# Patient Record
Sex: FEMALE | Race: WHITE | NOT HISPANIC OR LATINO | Employment: FULL TIME | URBAN - METROPOLITAN AREA
[De-identification: names, ages, dates, MRNs, and addresses within clinical notes are randomized per-mention and may not be internally consistent; named-entity substitution may affect disease eponyms.]

---

## 2017-03-30 ENCOUNTER — OFFICE VISIT (OUTPATIENT)
Dept: FAMILY MEDICINE | Facility: CLINIC | Age: 22
End: 2017-03-30
Payer: COMMERCIAL

## 2017-03-30 VITALS
TEMPERATURE: 98.3 F | HEART RATE: 92 BPM | DIASTOLIC BLOOD PRESSURE: 70 MMHG | HEIGHT: 68 IN | BODY MASS INDEX: 22.45 KG/M2 | SYSTOLIC BLOOD PRESSURE: 108 MMHG | WEIGHT: 148.1 LBS | OXYGEN SATURATION: 99 %

## 2017-03-30 DIAGNOSIS — R10.31 RLQ ABDOMINAL PAIN: Primary | ICD-10-CM

## 2017-03-30 PROBLEM — M43.16 SPONDYLOLISTHESIS OF LUMBAR REGION: Status: ACTIVE | Noted: 2017-03-30

## 2017-03-30 PROCEDURE — 99203 OFFICE O/P NEW LOW 30 MIN: CPT | Performed by: FAMILY MEDICINE

## 2017-03-30 NOTE — NURSING NOTE
"Chief Complaint   Patient presents with     Abdominal Pain       Initial /70 (BP Location: Right arm, Patient Position: Chair, Cuff Size: Adult Regular)  Pulse 92  Temp 98.3  F (36.8  C) (Oral)  Ht 5' 7.5\" (1.715 m)  Wt 148 lb 1.6 oz (67.2 kg)  SpO2 99%  BMI 22.85 kg/m2 Estimated body mass index is 22.85 kg/(m^2) as calculated from the following:    Height as of this encounter: 5' 7.5\" (1.715 m).    Weight as of this encounter: 148 lb 1.6 oz (67.2 kg).  BP completed using cuff size: regular      Shanice Hendrickson MA    "

## 2017-03-30 NOTE — MR AVS SNAPSHOT
"              After Visit Summary   3/30/2017    Precious Leavitt    MRN: [id number]           Patient Information     Date Of Birth          1995        Visit Information        Provider Department      3/30/2017 10:40 AM Yodit Rhodes, DO [facility]        Today's Diagnoses     RLQ abdominal pain    -  1       Follow-ups after your visit        Who to contact     If you have questions or need follow up information about today's clinic visit or your schedule please contact [facility] directly at [phone].  Normal or non-critical lab and imaging results will be communicated to you by Novaliqhart, letter or phone within 4 business days after the clinic has received the results. If you do not hear from us within 7 days, please contact the clinic through Novaliqhart or phone. If you have a critical or abnormal lab result, we will notify you by phone as soon as possible.  Submit refill requests through Ensequence or call your pharmacy and they will forward the refill request to us. Please allow 3 business days for your refill to be completed.          Additional Information About Your Visit        MyChart Information     Ensequence lets you send messages to your doctor, view your test results, renew your prescriptions, schedule appointments and more. To sign up, go to [email or web address] . Click on \"Log in\" on the left side of the screen, which will take you to the Welcome page. Then click on \"Sign up Now\" on the right side of the page.     You will be asked to enter the access code listed below, as well as some personal information. Please follow the directions to create your username and password.     Your access code is: [email or web address]  Expires: 2017 12:38 PM     Your access code will  in 90 days. If you need help or a new code, please call your [facility] or [phone].        Care EveryWhere ID     This is your Care EveryWhere ID. This could be used by " "other organizations to access your [place] medical records  [id number]        Your Vitals Were     Pulse Temperature Height Pulse Oximetry BMI (Body Mass Index)       92 98.3  F (36.8  C) (Oral) 5' 7.5\" (1.715 m) 99% 22.85 kg/m2        Blood Pressure from Last 3 Encounters:   03/30/17 108/70    Weight from Last 3 Encounters:   03/30/17 148 lb 1.6 oz (67.2 kg) (79 %)*     * Growth percentiles are based on [facility] 2-20 Years data.              Today, you had the following     No orders found for display       Primary Care Provider    None Specified       No primary provider on file.        Thank you!     Thank you for choosing [facility]  for your care. Our goal is always to provide you with excellent care. Hearing back from our patients is one way we can continue to improve our services. Please take a few minutes to complete the written survey that you may receive in the mail after your visit with us. Thank you!             Your Updated Medication List - Protect others around you: Learn how to safely use, store and throw away your medicines at www.disposemymeds.org.      Notice  As of 3/30/2017 12:38 PM    You have not been prescribed any medications.      "

## 2017-03-30 NOTE — PROGRESS NOTES
"  SUBJECTIVE:                                                    Precious Leavitt is a 21 year old female who presents to clinic today for the following health issues:    Abdominal Pain      Duration: Tuesday morning, had it about a month ago too    Description (location/character/radiation): lower right side, intermittent pangs of pain that initially lasted an hour, but now pain is more constant       Associated flank pain: sore on the right side     Intensity:  4/10    Accompanying signs and symptoms:        Fever/Chills: no        Gas/Bloating: no        Nausea/vomitting: no        Diarrhea: no        Dysuria or Hematuria: no     History (previous similar pain/trauma/previous testing): about one month ago    Precipitating or alleviating factors:       Pain worse with eating/BM/urination: None        Pain relieved by BM: no     Therapies tried and outcome: Tylenol     LMP:   Around 3/20/17     - just had a yeast infection about 2 weeks ago  - painful when she sneezes   - about 3 weeks ago she felt constipated and pain in the middle of her stomach, but it went away       Problem list and histories reviewed & adjusted, as indicated.  Additional history: as documented      ROS:  Constitutional, HEENT, cardiovascular, pulmonary, gi and gu systems are negative, except as otherwise noted.    OBJECTIVE:                                                    /70 (BP Location: Right arm, Patient Position: Chair, Cuff Size: Adult Regular)  Pulse 92  Temp 98.3  F (36.8  C) (Oral)  Ht 5' 7.5\" (1.715 m)  Wt 148 lb 1.6 oz (67.2 kg)  SpO2 99%  BMI 22.85 kg/m2  Body mass index is 22.85 kg/(m^2).  GENERAL: healthy, alert and no distress  RESP: lungs clear to auscultation - no rales, rhonchi or wheezes  CV: regular rates and rhythm, normal S1 S2, no S3 or S4 and no murmur, click or rub  ABDOMEN: bowel sounds normal, soft, mild TTP in RLQ with no rebound or guarding, no masses or organomegaly     ASSESSMENT/PLAN:            "                                           1. RLQ abdominal pain  - Large differential   - Patient just stopped her period and reports no sexual activity, so did not check a UCG  - Pain is mild so much less likely an appendicitis or other surgical emergency  - Suspect ovarian cysts vs msk issue vs gas pain  - Discussed peritoneal signs to watch for and to go to ER if these develop     Follow up on Saturday morning or Monday if pain continues or is worsening.  Go to ER immediately if pain suddenly gets severe.  If this continues she may need US vs CT     Yodit Rhodes,   [facility]

## 2017-03-31 ENCOUNTER — TELEPHONE (OUTPATIENT)
Dept: FAMILY MEDICINE | Facility: CLINIC | Age: 22
End: 2017-03-31

## 2017-03-31 NOTE — TELEPHONE ENCOUNTER
Mother calling regarding yesterday's visit.  CTC on file.  Pain some better with Ibuprofen but still there and not sure what to do.  Is coming back next week-end again.  Discussed giving a little time and if mid week still having issues then calling for order or follow-up appointment.  If worsening symptoms advised visit near her college.  Mother agrees with plan.  Marika Victor RN

## 2017-10-14 ENCOUNTER — OFFICE VISIT (OUTPATIENT)
Dept: FAMILY MEDICINE | Facility: CLINIC | Age: 22
End: 2017-10-14
Payer: COMMERCIAL

## 2017-10-14 VITALS
BODY MASS INDEX: 23.3 KG/M2 | WEIGHT: 151 LBS | RESPIRATION RATE: 12 BRPM | TEMPERATURE: 98.2 F | OXYGEN SATURATION: 100 % | HEART RATE: 92 BPM | DIASTOLIC BLOOD PRESSURE: 75 MMHG | SYSTOLIC BLOOD PRESSURE: 130 MMHG

## 2017-10-14 DIAGNOSIS — G43.109 MIGRAINE WITH AURA AND WITHOUT STATUS MIGRAINOSUS, NOT INTRACTABLE: Primary | ICD-10-CM

## 2017-10-14 PROCEDURE — 99213 OFFICE O/P EST LOW 20 MIN: CPT | Performed by: PHYSICIAN ASSISTANT

## 2017-10-14 RX ORDER — CETIRIZINE HYDROCHLORIDE 5 MG/1
TABLET, CHEWABLE ORAL
COMMUNITY
Start: 2007-05-14

## 2017-10-14 RX ORDER — SUMATRIPTAN 50 MG/1
50 TABLET, FILM COATED ORAL
Qty: 9 TABLET | Refills: 1 | Status: SHIPPED | OUTPATIENT
Start: 2017-10-14

## 2017-10-14 NOTE — NURSING NOTE
"Chief Complaint   Patient presents with     Headache       Initial /75 (BP Location: Right arm, Patient Position: Chair, Cuff Size: Adult Regular)  Pulse 92  Temp 98.2  F (36.8  C) (Oral)  Resp 12  Wt 151 lb (68.5 kg)  SpO2 100%  BMI 23.3 kg/m2 Estimated body mass index is 23.3 kg/(m^2) as calculated from the following:    Height as of 3/30/17: 5' 7.5\" (1.715 m).    Weight as of this encounter: 151 lb (68.5 kg).  Medication Reconciliation: complete       "

## 2017-10-14 NOTE — MR AVS SNAPSHOT
After Visit Summary   10/14/2017    Precious Leavitt    MRN: [id number]           Patient Information     Date Of Birth          1995        Visit Information        Provider Department      10/14/2017 10:00 AM Romeo Hurst PA-C [facility]        Today's Diagnoses     Migraine with aura and without status migrainosus, not intractable    -  1      Care Instructions      Preventing Migraine Headaches: Triggers     Red wine is a common migraine trigger.     The first step in preventing migraines is to learn what triggers them. You may then be able to control your triggers to avoid or reduce the severity of your migraines.  Know your triggers  Be aware that you may have more than one trigger, and that some triggers may work together. Common migraine triggers include:    Food and nutrition. Skipping meals or not drinking enough water can trigger headaches. So can certain foods, such as caffeine, monosodium glutamate (MSG), aged cheese, or sausage.    Alcohol. Red wine and other alcoholic beverages are common migraine triggers.    Chemicals. Scents, cleaning products, gasoline, glue, perfume, and paint can be triggers. So can tobacco smoke, including secondhand smoke.    Emotions. Stress can trigger headaches or make them worse once they begin.    Sleep disruption. Staying up late, sleeping late, and traveling across time zones can disrupt your sleep cycle, triggering headaches.    Hormones. Many women notice that migraines tend to happen at a certain point in their menstrual cycle. Birth control pills or hormone replacement therapy may also trigger migraines.    Environment and weather. Air travel, changes in altitude, air pressure changes, hot sun, or bright or flashing lights can be triggers.  Control your triggers  These are some of the things you can do to try to control triggers:    Avoid triggers if you can. For example, stay clear of alcohol and foods that trigger  "your headaches. Use unscented household products. Keep regular sleep habits. Manage stress to help control emotional triggers.    Change your behavior at times when triggers can't be avoided. For example, make sure to get enough rest and drink plenty of water while you're traveling. Make sure to carry a hat, sunglasses, and your medicines. Be alert for migraine symptoms, so you can treat a migraine early if it happens.  Date Last Reviewed: 10/9/2015    4060-5257 [employer]. [address] [place] [address]. All rights reserved. This information is not intended as a substitute for professional medical care. Always follow your healthcare professional's instructions.                Follow-ups after your visit        Who to contact     If you have questions or need follow up information about today's clinic visit or your schedule please contact [facility] directly at [phone].  Normal or non-critical lab and imaging results will be communicated to you by Picotek INChart, letter or phone within 4 business days after the clinic has received the results. If you do not hear from us within 7 days, please contact the clinic through Picotek INChart or phone. If you have a critical or abnormal lab result, we will notify you by phone as soon as possible.  Submit refill requests through Hashplex or call your pharmacy and they will forward the refill request to us. Please allow 3 business days for your refill to be completed.          Additional Information About Your Visit        Picotek INCharSanivation Information     Hashplex lets you send messages to your doctor, view your test results, renew your prescriptions, schedule appointments and more. To sign up, go to [email or web address] . Click on \"Log in\" on the left side of the screen, which will take you to the Welcome page. Then click on \"Sign up Now\" on the right side of the page.     You will be asked to enter the access code listed below, as well as some " personal information. Please follow the directions to create your username and password.     Your access code is: [email or web address]  Expires: 2018 10:50 AM     Your access code will  in 90 days. If you need help or a new code, please call your [place] clinic or [phone].        Care EveryWhere ID     This is your Care EveryWhere ID. This could be used by other organizations to access your [place] medical records  [id number]        Your Vitals Were     Pulse Temperature Respirations Pulse Oximetry BMI (Body Mass Index)       92 98.2  F (36.8  C) (Oral) 12 100% 23.3 kg/m2        Blood Pressure from Last 3 Encounters:   10/14/17 130/75   17 108/70    Weight from Last 3 Encounters:   10/14/17 151 lb (68.5 kg) (81 %)*   17 148 lb 1.6 oz (67.2 kg) (79 %)*     * Growth percentiles are based on [facility] 2-20 Years data.              Today, you had the following     No orders found for display         Today's Medication Changes          These changes are accurate as of: 10/14/17 10:50 AM.  If you have any questions, ask your nurse or doctor.               Start taking these medicines.        Dose/Directions    SUMAtriptan 50 MG tablet   Commonly known as:  IMITREX   Used for:  Migraine with aura and without status migrainosus, not intractable   Started by:  Romeo Hurst PA-C        Dose:  50 mg   Take 1 tablet (50 mg) by mouth at onset of headache for migraine May repeat in 2 hours. Max 4 tablets/24 hours.   Quantity:  9 tablet   Refills:  1            Where to get your medicines      These medications were sent to [facility]/pharmacy #[id number] - [place], [place] - [address]  [address], [facility] [address]     Phone:  [phone]     SUMAtriptan 50 MG tablet                Primary Care Provider    None Specified       No primary provider on file.        Equal Access to Services     LOAN MONGE AH: Angela Chong, jovana dumont, saul mccann  yokasta bourgeoisvelasquezlouis almaraz'aaboaz ah. So [facility] [phone].    ATENCIÓN: Si margotla shahriar, tiene a shah disposición servicios gratuitos de asistencia lingüística. [place] al [phone].    We comply with applicable federal civil rights laws and [place] laws. We do not discriminate on the basis of race, color, national origin, age, disability, sex, sexual orientation, or gender identity.            Thank you!     Thank you for choosing [facility]  for your care. Our goal is always to provide you with excellent care. Hearing back from our patients is one way we can continue to improve our services. Please take a few minutes to complete the written survey that you may receive in the mail after your visit with us. Thank you!             Your Updated Medication List - Protect others around you: Learn how to safely use, store and throw away your medicines at www.disposemymeds.org.          This list is accurate as of: 10/14/17 10:50 AM.  Always use your most recent med list.                   Brand Name Dispense Instructions for use Diagnosis    SUMAtriptan 50 MG tablet    IMITREX    9 tablet    Take 1 tablet (50 mg) by mouth at onset of headache for migraine May repeat in 2 hours. Max 4 tablets/24 hours.    Migraine with aura and without status migrainosus, not intractable       ZYRTEC CHILDRENS ALLERGY 5 MG Chew   Generic drug:  cetirizine

## 2017-10-14 NOTE — PROGRESS NOTES
"  SUBJECTIVE:   Precious Leavitt is a 21 year old female who presents to clinic today for the following health issues:    Headache - head feels \"heavy\" and ears are plugged  Onset: Dec '16 - on and off    Description:   Location: back of head in Dec but not the are bilateral on the sides, also getting optic migraines   Character: throbbing pain in Dec , sharp pain now  Frequency:  3 optic migraines in the last 2 wks. Usually has 1-2 per month.   Duration:  Optic 1 hr, migraine last 1-2 days    Intensity: moderate    Progression of Symptoms:  intermittent    Accompanying Signs & Symptoms:  Stiff neck: YES  Neck or upper back pain: no  Fever: no  Sinus pressure: YES- sometimes  Nausea or vomiting: YES- nausea with optic ha  Dizziness: no  Numbness: YES- every once in a while in the left hand  Weakness: no  Visual changes: YES- aura of tunnel vision.     History:   Head trauma: no  Family history of migraines: YES- mom - optic and migraine  Previous tests for headaches: no  Neurologist evaluations: no  Able to do daily activities: YES  Wake with a headaches: YES- sometimes  Do headaches wake you up: no  Daily pain medication use: no  Work/school stressors/changes: YES- midterms    Precipitating factors:   Does light make it worse: YES  Does sound make it worse: YES    Alleviating factors:  Does sleep help: YES    Therapies Tried and outcome: Tylenol-no improvement.       Problem list and histories reviewed & adjusted, as indicated.  Additional history: as documented    Patient Active Problem List   Diagnosis     Spondylolisthesis of lumbar region     Migraine with aura and without status migrainosus, not intractable     No past surgical history on file.    Social History   Substance Use Topics     Smoking status: Never Smoker     Smokeless tobacco: Never Used     Alcohol use Yes      Comment: occasionally     Family History   Problem Relation Age of Onset     Prostate Cancer Paternal Grandfather      Melanoma Paternal " Aunt      Migraines Mother          Current Outpatient Prescriptions   Medication Sig Dispense Refill     SUMAtriptan (IMITREX) 50 MG tablet Take 1 tablet (50 mg) by mouth at onset of headache for migraine May repeat in 2 hours. Max 4 tablets/24 hours. 9 tablet 1     cetirizine (ZYRTEC CHILDRENS ALLERGY) 5 MG CHEW        No Known Allergies  BP Readings from Last 3 Encounters:   10/14/17 130/75   03/30/17 108/70    Wt Readings from Last 3 Encounters:   10/14/17 151 lb (68.5 kg) (81 %)*   03/30/17 148 lb 1.6 oz (67.2 kg) (79 %)*     * Growth percentiles are based on [facility] 2-20 Years data.                        Reviewed and updated as needed this visit by clinical staffAllergies  Meds  Problems       Reviewed and updated as needed this visit by Provider  Allergies  Meds  Problems         ROS:  Constitutional, HEENT, neuro, psych, cardiovascular, pulmonary, gi systems are negative, except as otherwise noted.      OBJECTIVE:   /75 (BP Location: Right arm, Patient Position: Chair, Cuff Size: Adult Regular)  Pulse 92  Temp 98.2  F (36.8  C) (Oral)  Resp 12  Wt 151 lb (68.5 kg)  SpO2 100%  BMI 23.3 kg/m2  Body mass index is 23.3 kg/(m^2).  GENERAL: healthy, alert and no distress  EYES: Eyes grossly normal to inspection, PERRL and conjunctivae and sclerae normal  HENT: normal cephalic/atraumatic, ear canals and TM's normal, nasal mucosa edematous , oropharynx clear, oral mucous membranes moist and sinuses: not tender  RESP: lungs clear to auscultation - no rales, rhonchi or wheezes  CV: regular rates and rhythm, normal S1 S2, no S3 or S4 and no murmur, click or rub  NEURO: Normal strength and tone, sensory exam grossly normal, mentation intact, speech normal, cranial nerves 2-12 intact, gait normal including heel/toe/tandem walking, Romberg normal, rapid alternating movements normal and finger to nose normal.   PSYCH: mentation appears normal, affect normal/bright    Diagnostic Test Results:  none      ASSESSMENT/PLAN:     (G43.109) Migraine with aura and without status migrainosus, not intractable  (primary encounter diagnosis)  Comment: evident on history. Neuro exam normal. Trigger likely stress from school though allergies are possible. Of note, has been given imitrex in the past, but never used due to fear of side effects. Discussed while there are potential side effects, these are rare and given the severity of symptoms, would recommend taking this along with nsaids and benadryl prn. Given frequency currently, preventative medication is not needed, but if worsening, this could be possible. Also discussed avoidance of common triggers. See patient instructions. Follow up prn.    Plan: SUMAtriptan (IMITREX) 50 MG tablet        -Medication use and side effects discussed with the patient. Patient is in complete understanding and agreement with plan.         Follow up: as above     Romeo Hurst PA-C  [facility]

## 2017-10-14 NOTE — PATIENT INSTRUCTIONS
Preventing Migraine Headaches: Triggers     Red wine is a common migraine trigger.     The first step in preventing migraines is to learn what triggers them. You may then be able to control your triggers to avoid or reduce the severity of your migraines.  Know your triggers  Be aware that you may have more than one trigger, and that some triggers may work together. Common migraine triggers include:    Food and nutrition. Skipping meals or not drinking enough water can trigger headaches. So can certain foods, such as caffeine, monosodium glutamate (MSG), aged cheese, or sausage.    Alcohol. Red wine and other alcoholic beverages are common migraine triggers.    Chemicals. Scents, cleaning products, gasoline, glue, perfume, and paint can be triggers. So can tobacco smoke, including secondhand smoke.    Emotions. Stress can trigger headaches or make them worse once they begin.    Sleep disruption. Staying up late, sleeping late, and traveling across time zones can disrupt your sleep cycle, triggering headaches.    Hormones. Many women notice that migraines tend to happen at a certain point in their menstrual cycle. Birth control pills or hormone replacement therapy may also trigger migraines.    Environment and weather. Air travel, changes in altitude, air pressure changes, hot sun, or bright or flashing lights can be triggers.  Control your triggers  These are some of the things you can do to try to control triggers:    Avoid triggers if you can. For example, stay clear of alcohol and foods that trigger your headaches. Use unscented household products. Keep regular sleep habits. Manage stress to help control emotional triggers.    Change your behavior at times when triggers can't be avoided. For example, make sure to get enough rest and drink plenty of water while you're traveling. Make sure to carry a hat, sunglasses, and your medicines. Be alert for migraine symptoms, so you can treat a migraine early if it  happens.  Date Last Reviewed: 10/9/2015    1644-8623 [employer]. [address] [place] [address], [place] [address]. All rights reserved. This information is not intended as a substitute for professional medical care. Always follow your healthcare professional's instructions.

## 2019-01-10 ENCOUNTER — OFFICE VISIT (OUTPATIENT)
Dept: FAMILY MEDICINE | Facility: CLINIC | Age: 24
End: 2019-01-10
Payer: COMMERCIAL

## 2019-01-10 VITALS
SYSTOLIC BLOOD PRESSURE: 128 MMHG | HEART RATE: 93 BPM | WEIGHT: 160.2 LBS | TEMPERATURE: 97.8 F | DIASTOLIC BLOOD PRESSURE: 80 MMHG | HEIGHT: 68 IN | BODY MASS INDEX: 24.28 KG/M2 | RESPIRATION RATE: 12 BRPM

## 2019-01-10 DIAGNOSIS — Z12.4 SCREENING FOR MALIGNANT NEOPLASM OF CERVIX: ICD-10-CM

## 2019-01-10 DIAGNOSIS — Z23 NEED FOR TDAP VACCINATION: ICD-10-CM

## 2019-01-10 DIAGNOSIS — Z00.00 ROUTINE HISTORY AND PHYSICAL EXAMINATION OF ADULT: Primary | ICD-10-CM

## 2019-01-10 PROCEDURE — G0145 SCR C/V CYTO,THINLAYER,RESCR: HCPCS | Performed by: PHYSICIAN ASSISTANT

## 2019-01-10 PROCEDURE — 99395 PREV VISIT EST AGE 18-39: CPT | Mod: 25 | Performed by: PHYSICIAN ASSISTANT

## 2019-01-10 PROCEDURE — G0124 SCREEN C/V THIN LAYER BY MD: HCPCS | Performed by: PHYSICIAN ASSISTANT

## 2019-01-10 PROCEDURE — 87491 CHLMYD TRACH DNA AMP PROBE: CPT | Performed by: PHYSICIAN ASSISTANT

## 2019-01-10 PROCEDURE — 90715 TDAP VACCINE 7 YRS/> IM: CPT | Performed by: PHYSICIAN ASSISTANT

## 2019-01-10 PROCEDURE — 87591 N.GONORRHOEAE DNA AMP PROB: CPT | Performed by: PHYSICIAN ASSISTANT

## 2019-01-10 PROCEDURE — 90471 IMMUNIZATION ADMIN: CPT | Performed by: PHYSICIAN ASSISTANT

## 2019-01-10 ASSESSMENT — ENCOUNTER SYMPTOMS
PARESTHESIAS: 0
COUGH: 0
CHILLS: 0
NAUSEA: 0
DIZZINESS: 0
ABDOMINAL PAIN: 0
HEARTBURN: 0
CONSTIPATION: 0
SHORTNESS OF BREATH: 0
MYALGIAS: 0
ARTHRALGIAS: 0
HEMATOCHEZIA: 0
NERVOUS/ANXIOUS: 0
DYSURIA: 0
FREQUENCY: 0
SORE THROAT: 0
DIARRHEA: 0
WEAKNESS: 0
HEADACHES: 0
EYE PAIN: 0
PALPITATIONS: 0
HEMATURIA: 0
JOINT SWELLING: 0
FEVER: 0
BREAST MASS: 0

## 2019-01-10 ASSESSMENT — MIFFLIN-ST. JEOR: SCORE: 1536.19

## 2019-01-10 NOTE — LETTER
January 16, 2019      Precious Leavitt  [address]  [facility] [address]    Dear ,      This letter is in regards to your recent cervical cancer screening (Pap smear). Your Pap smear result was reported as ASCUS or Atypical Squamous Cells of Undetermined Significance. This means that there were mildly abnormal cells found in the sample that we collected from your cervix. The vast majority of patients with this result do not have significant cervical abnormalities.     Therefore, current guidelines recommend that you return in ONE year to repeat your Pap smear.      If you have additional questions regarding this result, please call our registered nurse, Helen at [phone].    Sincerely,      Mey Valladares PA-C/Lynette Nissen RN

## 2019-01-10 NOTE — PROGRESS NOTES
SUBJECTIVE:   CC: Precious Leavitt is an 23 year old woman who presents for preventive health visit.     Physical   Annual:     Getting at least 3 servings of Calcium per day:  Yes    Bi-annual eye exam:  NO    Dental care twice a year:  Yes    Sleep apnea or symptoms of sleep apnea:  None    Diet:  Regular (no restrictions)    Frequency of exercise:  2-3 days/week    Duration of exercise:  Greater than 60 minutes    Taking medications regularly:  Not Applicable    Additional concerns today:  No    PHQ-2 Total Score: 0              Today's PHQ-2 Score:   PHQ-2 ( 1999 [employer]) 1/10/2019   Q1: Little interest or pleasure in doing things 0   Q2: Feeling down, depressed or hopeless 0   PHQ-2 Score 0   Q1: Little interest or pleasure in doing things Not at all   Q2: Feeling down, depressed or hopeless Not at all   PHQ-2 Score 0       Abuse: Current or Past(Physical, Sexual or Emotional)- No  Do you feel safe in your environment? Yes    Social History     Tobacco Use     Smoking status: Never Smoker     Smokeless tobacco: Never Used   Substance Use Topics     Alcohol use: Yes     Comment: occasionally     Alcohol Use 1/10/2019   If you drink alcohol do you typically have greater than 3 drinks per day OR greater than 7 drinks per week? No       Reviewed orders with patient.  Reviewed health maintenance and updated orders accordingly - Yes  Labs reviewed in EPIC    Mammogram not appropriate for this patient based on age.    Pertinent mammograms are reviewed under the imaging tab.  History of abnormal Pap smear: NO - age 21-29 PAP every 3 years recommended     Reviewed and updated as needed this visit by clinical staff  Tobacco  Allergies  Meds  Med Hx  Surg Hx  Fam Hx  Soc Hx        Reviewed and updated as needed this visit by Provider        History reviewed. No pertinent past medical history.     Review of Systems   Constitutional: Negative for chills and fever.   HENT: Negative for congestion, ear pain, hearing  "loss and sore throat.    Eyes: Negative for pain and visual disturbance.   Respiratory: Negative for cough and shortness of breath.    Cardiovascular: Negative for chest pain, palpitations and peripheral edema.   Gastrointestinal: Negative for abdominal pain, constipation, diarrhea, heartburn, hematochezia and nausea.   Breasts:  Negative for tenderness, breast mass and discharge.   Genitourinary: Negative for dysuria, frequency, genital sores, hematuria, pelvic pain, urgency, vaginal bleeding and vaginal discharge.   Musculoskeletal: Negative for arthralgias, joint swelling and myalgias.   Skin: Negative for rash.   Neurological: Negative for dizziness, weakness, headaches and paresthesias.   Psychiatric/Behavioral: Negative for mood changes. The patient is not nervous/anxious.           OBJECTIVE:   /80 (BP Location: Right arm, Patient Position: Chair, Cuff Size: Adult Regular)   Pulse 93   Temp 97.8  F (36.6  C) (Oral)   Resp 12   Ht 1.721 m (5' 7.75\")   Wt 72.7 kg (160 lb 3.2 oz)   LMP 01/01/2019 (Exact Date)   Breastfeeding? No   BMI 24.54 kg/m    Physical Exam  GENERAL: healthy, alert and no distress  EYES: Eyes grossly normal to inspection, PERRL and conjunctivae and sclerae normal  HENT: ear canals and TM's normal, nose and mouth without ulcers or lesions  NECK: no adenopathy, no asymmetry, masses, or scars and thyroid normal to palpation  RESP: lungs clear to auscultation - no rales, rhonchi or wheezes  BREAST: normal without masses, tenderness or nipple discharge and no palpable axillary masses or adenopathy  CV: regular rate and rhythm, normal S1 S2, no S3 or S4, no murmur, click or rub, no peripheral edema and peripheral pulses strong  ABDOMEN: soft, nontender, no hepatosplenomegaly, no masses and bowel sounds normal   (female): normal female external genitalia, normal urethral meatus, vaginal mucosa pink, moist, well rugated, and normal cervix/adnexa/uterus without masses or " "discharge  MS: no gross musculoskeletal defects noted, no edema  SKIN: no suspicious lesions or rashes  NEURO: Normal strength and tone, mentation intact and speech normal  PSYCH: mentation appears normal, affect normal/bright        ASSESSMENT/PLAN:   1. Routine history and physical examination of adult    - Chlamydia trachomatis PCR  - Neisseria gonorrhoeae PCR    2. Screening for malignant neoplasm of cervix    - Pap imaged thin layer screen only - recommended age 21 - 24 years    3. Need for Tdap vaccination  Tdap given      COUNSELING:  Reviewed preventive health counseling, as reflected in patient instructions       Regular exercise       Healthy diet/nutrition       Immunizations    Vaccinated for: TDAP             Contraception       Family planning       Safe sex practices/STD prevention    BP Readings from Last 1 Encounters:   01/10/19 128/80     Estimated body mass index is 24.54 kg/m  as calculated from the following:    Height as of this encounter: 1.721 m (5' 7.75\").    Weight as of this encounter: 72.7 kg (160 lb 3.2 oz).           reports that  has never smoked. she has never used smokeless tobacco.      Counseling Resources:  ATP IV Guidelines  Pooled Cohorts Equation Calculator  Breast Cancer Risk Calculator  FRAX Risk Assessment  ICSI Preventive Guidelines  Dietary Guidelines for Americans, 2010  USDA's MyPlate  ASA Prophylaxis  Lung CA Screening    Mey Valladares PA-C  [facility]"

## 2019-01-11 LAB
C TRACH DNA SPEC QL NAA+PROBE: NEGATIVE
N GONORRHOEA DNA SPEC QL NAA+PROBE: NEGATIVE
SPECIMEN SOURCE: NORMAL
SPECIMEN SOURCE: NORMAL

## 2019-01-16 ENCOUNTER — RESULT FOLLOW UP (OUTPATIENT)
Dept: FAMILY MEDICINE | Facility: CLINIC | Age: 24
End: 2019-01-16

## 2019-01-16 DIAGNOSIS — R87.610 ATYPICAL SQUAMOUS CELLS OF UNDETERMINED SIGNIFICANCE (ASCUS) ON PAPANICOLAOU SMEAR OF CERVIX: ICD-10-CM

## 2019-01-16 LAB
COPATH REPORT: ABNORMAL
PAP: ABNORMAL

## 2019-01-16 NOTE — PROGRESS NOTES
1/10/19 ASCUS, 23 yr old. Plan 1 yr Dx pap cytology, due by 1/10/20  1/16/19 [facility] result letter sent an marked as read by the patient.  12/27/19 Pap reminder letter sent. ([facility])  01/24/20 [facility] clinic and schedule. ([facility])  02/21/20 Patient is lost to pap tracking follow-up. FYI routed to provider. ([facility])

## 2019-01-16 NOTE — LETTER
December 27, 2019      Precious Leaivtt  [address]  [place] [address]    Dear ,      At [facility], your health and wellness is our primary concern. That is why we are following up on an abnormal pap from 01/10/19, which was reported as ASCUS. Your provider had recommended that you have a Pap smear completed by 01/10/20. Our records do not show that this has been scheduled.    It is important to complete the follow up that your provider has suggested for you to ensure that there are no worsening changes which may, over time, develop into cancer.      Please contact our office at  [phone] to schedule an appointment for a Pap smear at your earliest convenience. If you have questions or concerns, please call the clinic and we will be happy to assist you.    If you have completed the tests outside of [facility], please have the results forwarded to our office. We will update the chart for your primary Physician to review before your next annual physical.     Thank you for choosing [facility]!    Sincerely,      Your [facility] Team//[facility]

## 2019-10-02 ENCOUNTER — HEALTH MAINTENANCE LETTER (OUTPATIENT)
Age: 24
End: 2019-10-02

## 2020-01-24 ENCOUNTER — TELEPHONE (OUTPATIENT)
Dept: FAMILY MEDICINE | Facility: CLINIC | Age: 25
End: 2020-01-24

## 2020-01-24 NOTE — TELEPHONE ENCOUNTER
Pt is past due for f/u pap smear.  [facility] clinic and schedule.    Silvia Yang  Pap Tracking

## 2020-03-22 ENCOUNTER — HEALTH MAINTENANCE LETTER (OUTPATIENT)
Age: 25
End: 2020-03-22

## 2021-01-15 ENCOUNTER — HEALTH MAINTENANCE LETTER (OUTPATIENT)
Age: 26
End: 2021-01-15

## 2021-05-15 ENCOUNTER — HEALTH MAINTENANCE LETTER (OUTPATIENT)
Age: 26
End: 2021-05-15

## 2021-09-04 ENCOUNTER — HEALTH MAINTENANCE LETTER (OUTPATIENT)
Age: 26
End: 2021-09-04

## 2022-06-11 ENCOUNTER — HEALTH MAINTENANCE LETTER (OUTPATIENT)
Age: 27
End: 2022-06-11

## 2022-10-22 ENCOUNTER — HEALTH MAINTENANCE LETTER (OUTPATIENT)
Age: 27
End: 2022-10-22

## 2023-06-18 ENCOUNTER — HEALTH MAINTENANCE LETTER (OUTPATIENT)
Age: 28
End: 2023-06-18

## 2023-11-12 ENCOUNTER — APPOINTMENT (OUTPATIENT)
Dept: URGENT CARE | Facility: URGENT CARE | Age: 28
End: 2023-11-12
Payer: COMMERCIAL

## 2024-01-28 ENCOUNTER — OFFICE VISIT (OUTPATIENT)
Dept: URGENT CARE | Facility: URGENT CARE | Age: 29
End: 2024-01-28
Payer: COMMERCIAL

## 2024-01-28 VITALS
BODY MASS INDEX: 24.51 KG/M2 | DIASTOLIC BLOOD PRESSURE: 70 MMHG | SYSTOLIC BLOOD PRESSURE: 102 MMHG | OXYGEN SATURATION: 98 % | HEART RATE: 90 BPM | WEIGHT: 160 LBS | RESPIRATION RATE: 22 BRPM | TEMPERATURE: 97.9 F

## 2024-01-28 DIAGNOSIS — J01.40 ACUTE NON-RECURRENT PANSINUSITIS: Primary | ICD-10-CM

## 2024-01-28 PROCEDURE — 99203 OFFICE O/P NEW LOW 30 MIN: CPT | Performed by: PHYSICIAN ASSISTANT

## 2024-01-28 RX ORDER — NORETHINDRONE ACETATE 5 MG
5 TABLET ORAL DAILY
COMMUNITY

## 2024-01-28 ASSESSMENT — PAIN SCALES - GENERAL: PAINLEVEL: NO PAIN (0)

## 2024-01-28 NOTE — PROGRESS NOTES
Acute non-recurrent pansinusitis  - amoxicillin-clavulanate (AUGMENTIN) 875-125 MG tablet; Take 1 tablet by mouth 2 times daily for 10 days    Age 12 months or more  Okay to use Zarbee's   Okay to use Rx Children Tylenol if prescribed (Dose based on weight)    Age 2-12:   Okay to use Children Motrin or Tylenol over the counter.    Adults:  Okay to take acetaminophen 500 mg- 2 tabs (Total of 1000 mg) every 8 hrs   Okay to take ibuprofen 200 mg- 3 tabs (Total of 600 mg) every 6 hours        Okay to use Neti pot for sinus lavage up to three times daily for congestion and sinus pressure if present. Daily hot shower can be beneficial for congestion and body aches. Okay to use bedroom vaporizer or humidifier if symptoms are worse at night. Nightly Vicks Vapor rub and 5-10 mg of Melatonin okay to use for sleep.     Over the counter cough medication and decongestants okay if not prescribed by me during this visit. For homeopathic alternatives to cough syrup and decongestant, feel free to try Elderberry extract.    Okay to use salt water gargles, warm tea (or warm water with lemon and honey), and lozenges for any throat discomfort. Chloraseptic spray is also highly encourages for throat pain/irritation.     Patient will need to get plenty of rest and drink at least 1.5-2 liters of fluids daily for adults and 1-1.5 liters for children. If vomiting and not tolerating liquids for more than 24 hrs, please go to your nearest emergency department for IV fluids and further treatment.     Patient is not contagious after 1 week from start of symptoms. If possible, wear mask for first 7 days. Wash hands regularly and vigorously for 30 seconds often.     Patient was advised to return to clinic for reevaluation (either UC or PCP) if symptoms do not improve in 7 days. Patient educated on red flag symptoms and asked to go directly to the ED if these symptoms present themselves.       EMORY Daugherty [facility]    Subjective   28 year old who presents to clinic today for the following health issues:    Urgent Care       HPI     Acute Illness  Acute illness concerns: Pressure in ears and congestion - Began as a cold   Onset/Duration: 9 days  Symptoms:  Fever: Fever on Sunday   Chills/Sweats: Fever on Sunday   Headache (location?): YES  Sinus Pressure: YES  Conjunctivitis:  No  Ear Pain: YES: bilateral and some hearing loss-   Rhinorrhea: YES  Congestion: YES  Sore Throat: mild first thing in the morning   Cough: YES  Wheeze: No  Decreased Appetite: No  Nausea: No  Vomiting: No  Diarrhea: No  Dysuria/Freq.: No  Dysuria or Hematuria: No  Fatigue/Achiness: Fatigue but no body aches   Sick/Strep Exposure: No  Therapies tried and outcome: None     Review of Systems   Review of Systems   See HPI    Objective    Temp: 97.9  F (36.6  C) Temp src: Temporal BP: 102/70 Pulse: 90   Resp: 22 SpO2: 98 %       Physical Exam   Physical Exam  Constitutional:       General: She is not in acute distress.     Appearance: Normal appearance. She is normal weight. She is not ill-appearing, toxic-appearing or diaphoretic.   HENT:      Head: Normocephalic and atraumatic.      Right Ear: Tympanic membrane, ear canal and external ear normal. There is no impacted cerumen.      Left Ear: Tympanic membrane, ear canal and external ear normal. There is no impacted cerumen.      Nose: Congestion and rhinorrhea present.      Right Sinus: Maxillary sinus tenderness and frontal sinus tenderness present.      Left Sinus: Maxillary sinus tenderness and frontal sinus tenderness present.      Mouth/Throat:      Mouth: Mucous membranes are moist.      Pharynx: No oropharyngeal exudate or posterior oropharyngeal erythema.   Cardiovascular:      Rate and Rhythm: Normal rate and regular rhythm.      Pulses: Normal pulses.      Heart sounds: Normal heart sounds. No murmur heard.     No friction rub. No gallop.   Pulmonary:      Effort: Pulmonary effort is normal.  No respiratory distress.      Breath sounds: No stridor. No wheezing, rhonchi or rales.   Chest:      Chest wall: No tenderness.   Lymphadenopathy:      Cervical: No cervical adenopathy.   Neurological:      General: No focal deficit present.      Mental Status: She is alert and oriented to person, place, and time. Mental status is at baseline.      Gait: Gait normal.   Psychiatric:         Mood and Affect: Mood normal.         Behavior: Behavior normal.         Thought Content: Thought content normal.         Judgment: Judgment normal.          No results found for this or any previous visit (from the past 24 hour(s)).

## 2024-08-11 ENCOUNTER — HEALTH MAINTENANCE LETTER (OUTPATIENT)
Age: 29
End: 2024-08-11

## 2025-08-16 ENCOUNTER — HEALTH MAINTENANCE LETTER (OUTPATIENT)
Age: 30
End: 2025-08-16